# Patient Record
Sex: FEMALE | Race: WHITE | URBAN - METROPOLITAN AREA
[De-identification: names, ages, dates, MRNs, and addresses within clinical notes are randomized per-mention and may not be internally consistent; named-entity substitution may affect disease eponyms.]

---

## 2023-04-11 ENCOUNTER — WEB ENCOUNTER (OUTPATIENT)
Dept: URBAN - METROPOLITAN AREA CLINIC 12 | Facility: CLINIC | Age: 68
End: 2023-04-11

## 2023-04-11 ENCOUNTER — OFFICE VISIT (OUTPATIENT)
Dept: URBAN - METROPOLITAN AREA CLINIC 12 | Facility: CLINIC | Age: 68
End: 2023-04-11
Payer: COMMERCIAL

## 2023-04-11 ENCOUNTER — TELEPHONE ENCOUNTER (OUTPATIENT)
Dept: URBAN - METROPOLITAN AREA CLINIC 12 | Facility: CLINIC | Age: 68
End: 2023-04-11

## 2023-04-11 VITALS
HEIGHT: 64 IN | SYSTOLIC BLOOD PRESSURE: 98 MMHG | WEIGHT: 130.6 LBS | DIASTOLIC BLOOD PRESSURE: 63 MMHG | HEART RATE: 70 BPM | BODY MASS INDEX: 22.3 KG/M2 | TEMPERATURE: 97.3 F

## 2023-04-11 DIAGNOSIS — R10.32 LLQ PAIN: ICD-10-CM

## 2023-04-11 PROCEDURE — 99204 OFFICE O/P NEW MOD 45 MIN: CPT | Performed by: INTERNAL MEDICINE

## 2023-04-11 NOTE — EXAM-PHYSICAL EXAM
General--no acute distress Abdomen--soft, mild tender to palpation in LLQ, non distended, bowel sounds present

## 2023-04-11 NOTE — HPI-TODAY'S VISIT:
67F here with LLQ pain says pain has been going on for past few weeks she was  treated with augmentin for diverticulitis for 1 week however she continues to have the pain BM everyday. no blood. no fever/N/V

## 2023-04-12 ENCOUNTER — TELEPHONE ENCOUNTER (OUTPATIENT)
Dept: URBAN - METROPOLITAN AREA CLINIC 92 | Facility: CLINIC | Age: 68
End: 2023-04-12

## 2023-04-12 ENCOUNTER — LAB OUTSIDE AN ENCOUNTER (OUTPATIENT)
Dept: URBAN - METROPOLITAN AREA CLINIC 23 | Facility: CLINIC | Age: 68
End: 2023-04-12

## 2023-04-12 ENCOUNTER — TELEPHONE ENCOUNTER (OUTPATIENT)
Dept: URBAN - METROPOLITAN AREA CLINIC 35 | Facility: CLINIC | Age: 68
End: 2023-04-12

## 2023-04-12 ENCOUNTER — OFFICE VISIT (OUTPATIENT)
Dept: URBAN - METROPOLITAN AREA CLINIC 111 | Facility: CLINIC | Age: 68
End: 2023-04-12

## 2023-04-12 LAB
CREATININE POC: 0.9
PERFORMING LAB: (no result)

## 2023-04-12 RX ORDER — AMOXICILLIN AND CLAVULANATE POTASSIUM 875; 125 MG/1; MG/1
1 TABLET TABLET, FILM COATED ORAL
Qty: 28 TABLET | Refills: 2 | OUTPATIENT
Start: 2023-04-12 | End: 2023-05-24

## 2023-04-14 ENCOUNTER — OFFICE VISIT (OUTPATIENT)
Dept: URBAN - METROPOLITAN AREA CLINIC 111 | Facility: CLINIC | Age: 68
End: 2023-04-14

## 2023-04-19 ENCOUNTER — DASHBOARD ENCOUNTERS (OUTPATIENT)
Age: 68
End: 2023-04-19

## 2024-10-01 ENCOUNTER — OFFICE VISIT (OUTPATIENT)
Dept: URBAN - METROPOLITAN AREA CLINIC 12 | Facility: CLINIC | Age: 69
End: 2024-10-01

## 2024-10-09 ENCOUNTER — LAB OUTSIDE AN ENCOUNTER (OUTPATIENT)
Dept: URBAN - METROPOLITAN AREA CLINIC 12 | Facility: CLINIC | Age: 69
End: 2024-10-09

## 2024-10-09 ENCOUNTER — OFFICE VISIT (OUTPATIENT)
Dept: URBAN - METROPOLITAN AREA CLINIC 12 | Facility: CLINIC | Age: 69
End: 2024-10-09
Payer: COMMERCIAL

## 2024-10-09 VITALS
HEIGHT: 64 IN | WEIGHT: 127.6 LBS | DIASTOLIC BLOOD PRESSURE: 70 MMHG | HEART RATE: 77 BPM | BODY MASS INDEX: 21.78 KG/M2 | TEMPERATURE: 97.1 F | SYSTOLIC BLOOD PRESSURE: 112 MMHG

## 2024-10-09 DIAGNOSIS — K51.00 PANCOLITIS: ICD-10-CM

## 2024-10-09 DIAGNOSIS — R18.8 OTHER ASCITES: ICD-10-CM

## 2024-10-09 DIAGNOSIS — E88.09 HYPOALBUMINEMIA: ICD-10-CM

## 2024-10-09 PROBLEM — 119247004: Status: ACTIVE | Noted: 2024-10-09

## 2024-10-09 PROCEDURE — 99204 OFFICE O/P NEW MOD 45 MIN: CPT | Performed by: STUDENT IN AN ORGANIZED HEALTH CARE EDUCATION/TRAINING PROGRAM

## 2024-10-09 NOTE — HPI-TODAY'S VISIT:
68 yo F here for follow up after hospitalization.  In the last few months she has had 2 separate hospitalizations at [facility]. Hospital records reviewed. Initially she was admitted to [facility] for pancolitis (suspected infectious).  During that hospitalization she had significantly low albumin, which led to her developing ascites and pleural effusion She had a paracentesis and a thoracentesis while admitted. After discharge she still was not improving -- continued to have significant abdominal pain  Returned to hospital and she reports that she had a bloot clot near her liver. Hospitalization was also complicated by SVT, treated with adenosine, and she is now on metoprolol.  While hospitalized she underwent colonoscopy on 9/18/24 that was normal.  Since discharge she is improved, however still not back to her usual state of health. Swelling has improved. Denies shortness of breath. Abd pain improved. Endorses low energy/fatigue.

## 2024-10-11 LAB
A/G RATIO: 1.2
ABSOLUTE BASOPHILS: 49
ABSOLUTE EOSINOPHILS: 410
ABSOLUTE LYMPHOCYTES: 1928
ABSOLUTE MONOCYTES: 551
ABSOLUTE NEUTROPHILS: 2462
ALBUMIN: 4
ALKALINE PHOSPHATASE: 64
ALT (SGPT): 9
AST (SGOT): 13
BASOPHILS: 0.9
BILIRUBIN, TOTAL: 0.6
BUN/CREATININE RATIO: (no result)
BUN: 17
CALCIUM: 9.4
CARBON DIOXIDE, TOTAL: 27
CHLORIDE: 104
CREATININE: 0.71
EGFR: 92
EOSINOPHILS: 7.6
GLOBULIN, TOTAL: 3.3
GLUCOSE: 103
HEMATOCRIT: 39
HEMOGLOBIN: 12.2
LYMPHOCYTES: 35.7
MCH: 28.2
MCHC: 31.3
MCV: 90.1
MONOCYTES: 10.2
MPV: 10.1
NEUTROPHILS: 45.6
PLATELET COUNT: 369
POTASSIUM: 4.2
PROTEIN, TOTAL: 7.3
RDW: 15.4
RED BLOOD CELL COUNT: 4.33
SODIUM: 140
WHITE BLOOD CELL COUNT: 5.4

## 2024-10-16 ENCOUNTER — TELEPHONE ENCOUNTER (OUTPATIENT)
Dept: URBAN - METROPOLITAN AREA CLINIC 23 | Facility: CLINIC | Age: 69
End: 2024-10-16

## 2024-10-22 ENCOUNTER — TELEPHONE ENCOUNTER (OUTPATIENT)
Dept: URBAN - METROPOLITAN AREA CLINIC 23 | Facility: CLINIC | Age: 69
End: 2024-10-22

## 2024-10-22 PROBLEM — 389026000: Status: ACTIVE | Noted: 2024-10-09

## 2024-10-23 ENCOUNTER — WEB ENCOUNTER (OUTPATIENT)
Dept: URBAN - METROPOLITAN AREA CLINIC 23 | Facility: CLINIC | Age: 69
End: 2024-10-23

## 2024-10-31 ENCOUNTER — WEB ENCOUNTER (OUTPATIENT)
Dept: URBAN - METROPOLITAN AREA CLINIC 23 | Facility: CLINIC | Age: 69
End: 2024-10-31

## 2024-11-01 ENCOUNTER — WEB ENCOUNTER (OUTPATIENT)
Dept: URBAN - METROPOLITAN AREA CLINIC 23 | Facility: CLINIC | Age: 69
End: 2024-11-01